# Patient Record
Sex: FEMALE | URBAN - METROPOLITAN AREA
[De-identification: names, ages, dates, MRNs, and addresses within clinical notes are randomized per-mention and may not be internally consistent; named-entity substitution may affect disease eponyms.]

---

## 2017-11-01 ENCOUNTER — APPOINTMENT (OUTPATIENT)
Age: 37
Setting detail: DERMATOLOGY
End: 2017-11-01

## 2017-11-01 DIAGNOSIS — D22 MELANOCYTIC NEVI: ICD-10-CM

## 2017-11-01 DIAGNOSIS — L57.8 OTHER SKIN CHANGES DUE TO CHRONIC EXPOSURE TO NONIONIZING RADIATION: ICD-10-CM

## 2017-11-01 DIAGNOSIS — L81.4 OTHER MELANIN HYPERPIGMENTATION: ICD-10-CM

## 2017-11-01 PROBLEM — D22.5 MELANOCYTIC NEVI OF TRUNK: Status: ACTIVE | Noted: 2017-11-01

## 2017-11-01 PROCEDURE — OTHER FOLLOW UP FOR NEXT VISIT: OTHER

## 2017-11-01 PROCEDURE — 99214 OFFICE O/P EST MOD 30 MIN: CPT

## 2017-11-01 PROCEDURE — OTHER SUNSCREEN RECOMMENDATIONS: OTHER

## 2017-11-01 PROCEDURE — OTHER PRESCRIPTION: OTHER

## 2017-11-01 PROCEDURE — OTHER TREATMENT REGIMEN: OTHER

## 2017-11-01 PROCEDURE — OTHER COUNSELING: OTHER

## 2017-11-01 PROCEDURE — OTHER REASSURANCE: OTHER

## 2017-11-01 RX ORDER — TRETINOIN 0.5 MG/G
THIN COAT CREAM TOPICAL QHS
Qty: 1 | Refills: 2 | Status: ERX | COMMUNITY
Start: 2017-11-01

## 2017-11-01 ASSESSMENT — LOCATION SIMPLE DESCRIPTION DERM
LOCATION SIMPLE: RIGHT UPPER BACK
LOCATION SIMPLE: CHEST
LOCATION SIMPLE: RIGHT CHEEK
LOCATION SIMPLE: LEFT CHEEK

## 2017-11-01 ASSESSMENT — BSA RASH: BSA RASH: 2

## 2017-11-01 ASSESSMENT — LOCATION DETAILED DESCRIPTION DERM
LOCATION DETAILED: RIGHT MEDIAL UPPER BACK
LOCATION DETAILED: RIGHT CENTRAL MALAR CHEEK
LOCATION DETAILED: LEFT LATERAL SUPERIOR CHEST
LOCATION DETAILED: LEFT INFERIOR CENTRAL MALAR CHEEK

## 2017-11-01 ASSESSMENT — SEVERITY ASSESSMENT: SEVERITY: MILD TO MODERATE

## 2017-11-01 ASSESSMENT — LOCATION ZONE DERM
LOCATION ZONE: TRUNK
LOCATION ZONE: FACE

## 2017-11-01 NOTE — PROCEDURE: REASSURANCE
Include Location In Plan?: Yes
Detail Level: Generalized
Additional Note: Otherwise normal full body skin exam, no suspicious lesions, no abnormal moles. Follow up annually
Additional Note: Normal full body skin exam, no evidence of any pre-cancers or skin cancers. Continue regular follow up, full body skin exam annually and daily sunscreen use

## 2017-11-01 NOTE — PROCEDURE: TREATMENT REGIMEN
Detail Level: Simple
Plan: Possible hormonal component, start generic Retin-A. Call if cost is too high so I can change the pharmacy to the compounding pharmacy. I did inform the patient it can take up to six months to see benefit. Stop using the medication if she is planning pregnancy. Call or follow up if there are any problems. Daily sunscreen use recommended

## 2018-11-27 ENCOUNTER — APPOINTMENT (OUTPATIENT)
Age: 38
Setting detail: DERMATOLOGY
End: 2018-11-27

## 2018-11-27 VITALS — HEIGHT: 68 IN | WEIGHT: 180 LBS

## 2018-11-27 DIAGNOSIS — L81.4 OTHER MELANIN HYPERPIGMENTATION: ICD-10-CM

## 2018-11-27 DIAGNOSIS — B35.4 TINEA CORPORIS: ICD-10-CM

## 2018-11-27 PROBLEM — L55.1 SUNBURN OF SECOND DEGREE: Status: ACTIVE | Noted: 2018-11-27

## 2018-11-27 PROCEDURE — OTHER COUNSELING: OTHER

## 2018-11-27 PROCEDURE — OTHER MIPS QUALITY: OTHER

## 2018-11-27 PROCEDURE — OTHER FOLLOW UP FOR NEXT VISIT: OTHER

## 2018-11-27 PROCEDURE — 99214 OFFICE O/P EST MOD 30 MIN: CPT

## 2018-11-27 PROCEDURE — OTHER TREATMENT REGIMEN: OTHER

## 2018-11-27 ASSESSMENT — LOCATION DETAILED DESCRIPTION DERM
LOCATION DETAILED: RIGHT INFERIOR CENTRAL MALAR CHEEK
LOCATION DETAILED: LEFT RIB CAGE
LOCATION DETAILED: LEFT INFERIOR CENTRAL MALAR CHEEK
LOCATION DETAILED: RIGHT RIB CAGE

## 2018-11-27 ASSESSMENT — LOCATION SIMPLE DESCRIPTION DERM
LOCATION SIMPLE: ABDOMEN
LOCATION SIMPLE: RIGHT CHEEK
LOCATION SIMPLE: LEFT CHEEK

## 2018-11-27 ASSESSMENT — BSA RASH: BSA RASH: 1

## 2018-11-27 ASSESSMENT — SEVERITY ASSESSMENT: SEVERITY: MILD TO MODERATE

## 2018-11-27 ASSESSMENT — LOCATION ZONE DERM
LOCATION ZONE: FACE
LOCATION ZONE: TRUNK

## 2018-11-27 NOTE — PROCEDURE: FOLLOW UP FOR NEXT VISIT
Detail Level: Simple
Scheduled For Follow Up In (Optional): PRN
Scheduled For Follow Up In (Optional): 2 months

## 2018-11-27 NOTE — PROCEDURE: TREATMENT REGIMEN
Plan: This appears consistent with a fungal infection of the skin, patient will use the samples twice daily for up to two weeks. Call or follow up if no improvement or if a prescription is needed. If these do not resolve we might consider a topical steroid
Detail Level: Simple
Samples Given: Retin A Micro 0.06%
Plan: Patient will try the samples and if she tolerates the medication we can send in a prescription to the mail order pharmacy. Follow up annually for full body skin exam
Samples Given: Xolegel 2% Gel

## 2018-11-27 NOTE — HPI: BUMPS
How Severe Are Your Bumps?: mild
Have Your Bumps Been Treated?: not been treated
Is This A New Presentation, Or A Follow-Up?: Bumps
Additional History: Patient with two fairly circular rashes in the right and left armpit for the past three months. They are asymptomatic, no itching, no burning. She has not tried any topical medication. No other skin surfaces are affected. Regarding the generic Tretinoin prescription that we wrote for last year the patient never did fill the prescription because the cost was too high but she is interested in topical treatment of the discoloration on the right and left cheeks

## 2018-12-18 ENCOUNTER — RX ONLY (RX ONLY)
Age: 38
End: 2018-12-18

## 2018-12-18 RX ORDER — TRIAMCINOLONE ACETONIDE 1 MG/G
THIN COAT CREAM TOPICAL BID
Qty: 1 | Refills: 0 | Status: ERX | COMMUNITY
Start: 2018-12-18

## 2019-04-18 ENCOUNTER — APPOINTMENT (OUTPATIENT)
Age: 39
Setting detail: DERMATOLOGY
End: 2019-04-18

## 2019-04-18 VITALS — HEIGHT: 68 IN | WEIGHT: 190 LBS

## 2019-04-18 DIAGNOSIS — L92.0 GRANULOMA ANNULARE: ICD-10-CM

## 2019-04-18 PROCEDURE — OTHER MIPS QUALITY: OTHER

## 2019-04-18 PROCEDURE — OTHER COUNSELING: OTHER

## 2019-04-18 PROCEDURE — OTHER FOLLOW UP FOR NEXT VISIT: OTHER

## 2019-04-18 PROCEDURE — OTHER TREATMENT REGIMEN: OTHER

## 2019-04-18 PROCEDURE — 99213 OFFICE O/P EST LOW 20 MIN: CPT

## 2019-04-18 PROCEDURE — OTHER PRESCRIPTION: OTHER

## 2019-04-18 RX ORDER — CLOBETASOL PROPIONATE 0.5 MG/G
APPLY THIN COAT OINTMENT TOPICAL BID
Qty: 1 | Refills: 1 | Status: ERX | COMMUNITY
Start: 2019-04-18

## 2019-04-18 ASSESSMENT — BSA RASH: BSA RASH: 2

## 2019-04-18 ASSESSMENT — LOCATION ZONE DERM: LOCATION ZONE: TRUNK

## 2019-04-18 ASSESSMENT — LOCATION SIMPLE DESCRIPTION DERM: LOCATION SIMPLE: ABDOMEN

## 2019-04-18 ASSESSMENT — LOCATION DETAILED DESCRIPTION DERM
LOCATION DETAILED: LEFT RIB CAGE
LOCATION DETAILED: RIGHT RIB CAGE

## 2019-04-18 ASSESSMENT — SEVERITY ASSESSMENT: SEVERITY: MILD

## 2019-04-18 NOTE — PROCEDURE: TREATMENT REGIMEN
Detail Level: Simple
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 Patient will use the strong topical steroid twice daily for 2 to 4 weeks to call or follow up if no improvement. Possible biopsy at follow up

## 2019-04-18 NOTE — PROCEDURE: FOLLOW UP FOR NEXT VISIT
Instructions (Optional): If symptoms worsen or do not improve
Scheduled For Follow Up In (Optional): 2-4 weeks
Detail Level: Detailed

## 2021-10-04 ENCOUNTER — APPOINTMENT (OUTPATIENT)
Age: 41
Setting detail: DERMATOLOGY
End: 2021-10-04

## 2021-10-04 DIAGNOSIS — L28.0 LICHEN SIMPLEX CHRONICUS: ICD-10-CM

## 2021-10-04 DIAGNOSIS — D22 MELANOCYTIC NEVI: ICD-10-CM

## 2021-10-04 DIAGNOSIS — L57.8 OTHER SKIN CHANGES DUE TO CHRONIC EXPOSURE TO NONIONIZING RADIATION: ICD-10-CM

## 2021-10-04 PROBLEM — D22.5 MELANOCYTIC NEVI OF TRUNK: Status: ACTIVE | Noted: 2021-10-04

## 2021-10-04 PROCEDURE — OTHER COUNSELING: OTHER

## 2021-10-04 PROCEDURE — 99213 OFFICE O/P EST LOW 20 MIN: CPT

## 2021-10-04 PROCEDURE — OTHER SUNSCREEN RECOMMENDATIONS: OTHER

## 2021-10-04 PROCEDURE — OTHER REASSURANCE: OTHER

## 2021-10-04 PROCEDURE — OTHER MIPS QUALITY: OTHER

## 2021-10-04 PROCEDURE — OTHER TREATMENT REGIMEN: OTHER

## 2021-10-04 ASSESSMENT — LOCATION DETAILED DESCRIPTION DERM
LOCATION DETAILED: LEFT ANTERIOR PROXIMAL UPPER ARM
LOCATION DETAILED: LEFT MEDIAL DORSAL FOOT
LOCATION DETAILED: MID POSTERIOR NECK
LOCATION DETAILED: SUPERIOR LUMBAR SPINE
LOCATION DETAILED: RIGHT ANTERIOR PROXIMAL UPPER ARM
LOCATION DETAILED: PERIUMBILICAL SKIN
LOCATION DETAILED: INFERIOR MID FOREHEAD

## 2021-10-04 ASSESSMENT — LOCATION ZONE DERM
LOCATION ZONE: NECK
LOCATION ZONE: ARM
LOCATION ZONE: FACE
LOCATION ZONE: FEET
LOCATION ZONE: TRUNK

## 2021-10-04 ASSESSMENT — LOCATION SIMPLE DESCRIPTION DERM
LOCATION SIMPLE: INFERIOR FOREHEAD
LOCATION SIMPLE: LEFT UPPER ARM
LOCATION SIMPLE: RIGHT UPPER ARM
LOCATION SIMPLE: POSTERIOR NECK
LOCATION SIMPLE: ABDOMEN
LOCATION SIMPLE: LOWER BACK
LOCATION SIMPLE: LEFT FOOT

## 2021-10-04 ASSESSMENT — SEVERITY ASSESSMENT: SEVERITY: MILD

## 2021-10-04 ASSESSMENT — BSA RASH: BSA RASH: 0

## 2021-10-04 NOTE — PROCEDURE: TREATMENT REGIMEN
Plan: Patient will simply use a daily moisturizing lotion and stop picking at the lesions. If she ever decide she would like to come in for cryotherapy we can do that. Otherwise follow up annually
Detail Level: Simple

## 2021-10-04 NOTE — PROCEDURE: REASSURANCE
Detail Level: Generalized
Additional Note: Normal fbse, patient counseled regarding sunscreen and how to use properly. Follow up if areas change or become painful, otherwise follow up annually.
Include Location In Plan?: Yes
Additional Note: Normal full body skin exam, no evidence of any pre-cancers or skin cancers. Continue regular follow up, full body skin exam annually and daily sunscreen use

## 2021-10-04 NOTE — HPI: SKIN LESION
What Type Of Note Output Would You Prefer (Optional)?: Bullet Format
How Severe Is Your Skin Lesion?: moderate
Has Your Skin Lesion Been Treated?: not been treated
Is This A New Presentation, Or A Follow-Up?: Skin Lesions
Additional History: Patient here to establish TBE. She is not worried about anything in particular, she just wants some areas on the arms especially checked over.  She has two spots on her right and left arm that are slightly raised and she is aware they are probably there because she continuously or periodically pics at them. Otherwise no complaints

## 2023-03-22 NOTE — PROCEDURE: MIPS QUALITY
Quality 110: Preventive Care And Screening: Influenza Immunization: Influenza Immunization not Administered because Patient Refused.
Detail Level: Detailed
Split-Thickness Skin Graft Text: The defect edges were debeveled with a #15 scalpel blade. Given the location of the defect, shape of the defect and the proximity to free margins a split thickness skin graft was deemed most appropriate. Using a sterile surgical marker, the primary defect shape was transferred to the donor site. The split thickness graft was then harvested.  The skin graft was then placed in the primary defect and oriented appropriately.